# Patient Record
Sex: MALE | Race: WHITE | URBAN - METROPOLITAN AREA
[De-identification: names, ages, dates, MRNs, and addresses within clinical notes are randomized per-mention and may not be internally consistent; named-entity substitution may affect disease eponyms.]

---

## 2023-05-18 ENCOUNTER — APPOINTMENT (RX ONLY)
Dept: URBAN - METROPOLITAN AREA CLINIC 332 | Facility: CLINIC | Age: 83
Setting detail: DERMATOLOGY
End: 2023-05-18

## 2023-05-18 DIAGNOSIS — D485 NEOPLASM OF UNCERTAIN BEHAVIOR OF SKIN: ICD-10-CM

## 2023-05-18 DIAGNOSIS — L57.8 OTHER SKIN CHANGES DUE TO CHRONIC EXPOSURE TO NONIONIZING RADIATION: ICD-10-CM

## 2023-05-18 DIAGNOSIS — L82.1 OTHER SEBORRHEIC KERATOSIS: ICD-10-CM

## 2023-05-18 PROBLEM — D48.5 NEOPLASM OF UNCERTAIN BEHAVIOR OF SKIN: Status: ACTIVE | Noted: 2023-05-18

## 2023-05-18 PROCEDURE — 99203 OFFICE O/P NEW LOW 30 MIN: CPT | Mod: 25

## 2023-05-18 PROCEDURE — 11103 TANGNTL BX SKIN EA SEP/ADDL: CPT | Mod: 59

## 2023-05-18 PROCEDURE — 11102 TANGNTL BX SKIN SINGLE LES: CPT | Mod: 59

## 2023-05-18 PROCEDURE — ? COUNSELING

## 2023-05-18 PROCEDURE — 69100 BIOPSY OF EXTERNAL EAR: CPT

## 2023-05-18 PROCEDURE — ? BIOPSY BY SHAVE METHOD

## 2023-05-18 ASSESSMENT — LOCATION DETAILED DESCRIPTION DERM
LOCATION DETAILED: RIGHT CENTRAL MALAR CHEEK
LOCATION DETAILED: LEFT RADIAL DORSAL HAND
LOCATION DETAILED: LEFT SUPERIOR MEDIAL FOREHEAD
LOCATION DETAILED: LEFT ANTERIOR EARLOBE
LOCATION DETAILED: LEFT MEDIAL MALAR CHEEK
LOCATION DETAILED: RIGHT ULNAR DORSAL HAND
LOCATION DETAILED: LEFT INFERIOR ANTERIOR NECK

## 2023-05-18 ASSESSMENT — LOCATION ZONE DERM
LOCATION ZONE: NECK
LOCATION ZONE: FACE
LOCATION ZONE: EAR
LOCATION ZONE: HAND

## 2023-05-18 ASSESSMENT — LOCATION SIMPLE DESCRIPTION DERM
LOCATION SIMPLE: LEFT ANTERIOR NECK
LOCATION SIMPLE: LEFT CHEEK
LOCATION SIMPLE: LEFT EAR
LOCATION SIMPLE: LEFT HAND
LOCATION SIMPLE: RIGHT HAND
LOCATION SIMPLE: RIGHT CHEEK
LOCATION SIMPLE: LEFT FOREHEAD

## 2023-06-05 ENCOUNTER — APPOINTMENT (RX ONLY)
Dept: URBAN - METROPOLITAN AREA CLINIC 332 | Facility: CLINIC | Age: 83
Setting detail: DERMATOLOGY
End: 2023-06-05

## 2023-06-05 PROBLEM — C44.219 BASAL CELL CARCINOMA OF SKIN OF LEFT EAR AND EXTERNAL AURICULAR CANAL: Status: ACTIVE | Noted: 2023-06-05

## 2023-06-05 PROCEDURE — ? MOHS SURGERY

## 2023-06-05 PROCEDURE — 15275 SKIN SUB GRAFT FACE/NK/HF/G: CPT

## 2023-06-05 PROCEDURE — 17312 MOHS ADDL STAGE: CPT

## 2023-06-05 PROCEDURE — ? ADDITIONAL NOTES

## 2023-06-05 PROCEDURE — 17311 MOHS 1 STAGE H/N/HF/G: CPT

## 2023-06-05 NOTE — PROCEDURE: MOHS SURGERY
Anesthesia Type: 1% lidocaine without epinephrine Size Of Lesion In Cm: 1.9 Detail Level: Detailed What Was Performed First?: Curettage Additional Information: (Optional): The wound was cleaned, and a pressure dressing was applied.  The patient received detailed post-op instructions. Anesthesia Volume In Cc: 1 Cautery Type: electrodesiccation Number Of Curettages: 3 Post-Care Instructions: I reviewed with the patient in detail post-care instructions. Patient is to keep the area dry for 48 hours, and not to engage in any swimming until the area is healed. Should the patient develop any fevers, chills, bleeding, severe pain patient will contact the office immediately. Bill For Surgical Tray: no Consent was obtained from the patient.  Alternative treatment methods were also discussed.  Prior to the procedure, the treatment site was clearly identified and confirmed by the patient. Bill As A Line Item Or As Units: Line Item Size Of Lesion After Curettage: 2.5 Estlander Flap (Upper To Lower Lip) Text: The defect of the lower lip was assessed and measured.  Given the location and size of the defect, an Estlander flap was deemed most appropriate. Using a sterile surgical marker, an appropriate Estlander flap was measured and drawn on the upper lip. Local anesthesia was then infiltrated. A scalpel was then used to incise the lateral aspect of the flap, through skin, muscle and mucosa, leaving the flap pedicled medially.  The flap was then rotated and positioned to fill the lower lip defect.  The flap was then sutured into place with a three layer technique, closing the orbicularis oris muscle layer with subcutaneous buried sutures, followed by a mucosal layer and an epidermal layer.

## 2023-06-14 ENCOUNTER — APPOINTMENT (RX ONLY)
Dept: URBAN - METROPOLITAN AREA CLINIC 332 | Facility: CLINIC | Age: 83
Setting detail: DERMATOLOGY
End: 2023-06-14

## 2023-06-14 VITALS — HEART RATE: 66 BPM | SYSTOLIC BLOOD PRESSURE: 124 MMHG | DIASTOLIC BLOOD PRESSURE: 70 MMHG

## 2023-06-14 PROBLEM — C44.219 BASAL CELL CARCINOMA OF SKIN OF LEFT EAR AND EXTERNAL AURICULAR CANAL: Status: ACTIVE | Noted: 2023-06-14

## 2023-06-14 PROBLEM — C44.319 BASAL CELL CARCINOMA OF SKIN OF OTHER PARTS OF FACE: Status: ACTIVE | Noted: 2023-06-14

## 2023-06-14 PROCEDURE — 17312 MOHS ADDL STAGE: CPT

## 2023-06-14 PROCEDURE — ? PRESCRIPTION

## 2023-06-14 PROCEDURE — ? ADDITIONAL NOTES

## 2023-06-14 PROCEDURE — 17311 MOHS 1 STAGE H/N/HF/G: CPT

## 2023-06-14 PROCEDURE — ? DIAGNOSIS COMMENT

## 2023-06-14 PROCEDURE — ? REPAIR NOTE

## 2023-06-14 PROCEDURE — 15275 SKIN SUB GRAFT FACE/NK/HF/G: CPT

## 2023-06-14 PROCEDURE — ? PRESCRIPTION MEDICATION MANAGEMENT

## 2023-06-14 PROCEDURE — ? MOHS SURGERY

## 2023-06-14 PROCEDURE — 13151 CMPLX RPR E/N/E/L 1.1-2.5 CM: CPT | Mod: 59

## 2023-06-14 RX ORDER — MUPIROCIN 20 MG/G
1 OINTMENT TOPICAL BID
Qty: 22 | Refills: 2 | Status: ERX | COMMUNITY
Start: 2023-06-14

## 2023-06-14 RX ADMIN — MUPIROCIN 1: 20 OINTMENT TOPICAL at 00:00

## 2023-06-14 NOTE — PROCEDURE: REPAIR NOTE
No Keystone Flap Text: The defect edges were debeveled with a #15 scalpel blade. Given the location of the defect, shape of the defect a keystone flap was deemed most appropriate. Using a sterile surgical marker, an appropriate keystone flap was drawn incorporating the defect, outlining the appropriate donor tissue and placing the expected incisions within the relaxed skin tension lines where possible. The area thus outlined was incised deep to adipose tissue with a #15 scalpel blade. The skin margins were undermined to an appropriate distance in all directions around the primary defect and laterally outward around the flap utilizing iris scissors. Following this, the designed flap was carried into the primary defect and sutured into place.

## 2023-06-14 NOTE — PROCEDURE: MOHS SURGERY
Crescentic Advancement Flap Text: The defect edges were debeveled with a #15 scalpel blade.  Given the location of the defect and the proximity to free margins a crescentic advancement flap was deemed most appropriate.  Using a sterile surgical marker, the appropriate advancement flap was drawn incorporating the defect and placing the expected incisions within the relaxed skin tension lines where possible.    The area thus outlined was incised deep to adipose tissue with a #15 scalpel blade.  The skin margins were undermined to an appropriate distance in all directions utilizing iris scissors.  Following this, the designed flap was advanced and carried over into the primary defect and sutured into place.

## 2023-06-14 NOTE — PROCEDURE: MOHS SURGERY
Burow's Advancement Flap Text: The defect edges were debeveled with a #15 scalpel blade.  Given the location of the defect and the proximity to free margins a Burow's advancement flap was deemed most appropriate.  Using a sterile surgical marker, the appropriate advancement flap was drawn incorporating the defect and placing the expected incisions within the relaxed skin tension lines where possible.    The area thus outlined was incised deep to adipose tissue with a #15 scalpel blade.  The skin margins were undermined to an appropriate distance in all directions utilizing iris scissors.  Following this, the designed flap was advanced and carried over into the primary defect and sutured into place.

## 2023-06-14 NOTE — PROCEDURE: ADDITIONAL NOTES
Render Risk Assessment In Note?: no
Additional Notes: Patient consent was obtained to proceed with the visit and recommended plan of care after discussion of all risks and benefits, including the risks of COVID-19 exposure.
Detail Level: Simple
Additional Notes: Area debreeded and prepped for a complex closure

## 2023-06-21 ENCOUNTER — APPOINTMENT (RX ONLY)
Dept: URBAN - METROPOLITAN AREA CLINIC 332 | Facility: CLINIC | Age: 83
Setting detail: DERMATOLOGY
End: 2023-06-21

## 2023-06-21 PROBLEM — C44.319 BASAL CELL CARCINOMA OF SKIN OF OTHER PARTS OF FACE: Status: ACTIVE | Noted: 2023-06-21

## 2023-06-21 PROCEDURE — ? POST-OP WOUND EVALUATION

## 2023-06-21 PROCEDURE — ? DIAGNOSIS COMMENT

## 2023-06-21 PROCEDURE — ? PRESCRIPTION

## 2023-06-21 PROCEDURE — 99213 OFFICE O/P EST LOW 20 MIN: CPT | Mod: 24

## 2023-06-21 RX ORDER — MUPIROCIN 20 MG/G
OINTMENT TOPICAL BID
Qty: 22 | Refills: 2 | Status: ERX

## 2023-06-21 NOTE — PROCEDURE: DIAGNOSIS COMMENT
Comment: [facility]
Detail Level: Simple
Render Risk Assessment In Note?: no

## 2024-04-04 NOTE — PROCEDURE: PRESCRIPTION MEDICATION MANAGEMENT
Never
Render In Strict Bullet Format?: No
Initiate Treatment: Mupirocin 2% ointment
Detail Level: Zone